# Patient Record
Sex: FEMALE | Race: WHITE | NOT HISPANIC OR LATINO | Employment: OTHER | URBAN - METROPOLITAN AREA
[De-identification: names, ages, dates, MRNs, and addresses within clinical notes are randomized per-mention and may not be internally consistent; named-entity substitution may affect disease eponyms.]

---

## 2024-05-21 ENCOUNTER — OFFICE VISIT (OUTPATIENT)
Dept: CARDIOLOGY | Facility: CLINIC | Age: 64
End: 2024-05-21
Payer: COMMERCIAL

## 2024-05-21 VITALS
SYSTOLIC BLOOD PRESSURE: 144 MMHG | WEIGHT: 172 LBS | OXYGEN SATURATION: 98 % | HEART RATE: 76 BPM | DIASTOLIC BLOOD PRESSURE: 87 MMHG

## 2024-05-21 DIAGNOSIS — E78.5 HYPERLIPIDEMIA, UNSPECIFIED HYPERLIPIDEMIA TYPE: ICD-10-CM

## 2024-05-21 DIAGNOSIS — I71.21 ANEURYSM OF ASCENDING AORTA WITHOUT RUPTURE (CMS-HCC): Primary | ICD-10-CM

## 2024-05-21 DIAGNOSIS — I10 PRIMARY HYPERTENSION: ICD-10-CM

## 2024-05-21 DIAGNOSIS — Z82.49 FAMILY HISTORY OF AORTIC ANEURYSM: ICD-10-CM

## 2024-05-21 PROCEDURE — 3077F SYST BP >= 140 MM HG: CPT | Performed by: STUDENT IN AN ORGANIZED HEALTH CARE EDUCATION/TRAINING PROGRAM

## 2024-05-21 PROCEDURE — 99204 OFFICE O/P NEW MOD 45 MIN: CPT | Performed by: STUDENT IN AN ORGANIZED HEALTH CARE EDUCATION/TRAINING PROGRAM

## 2024-05-21 PROCEDURE — 99214 OFFICE O/P EST MOD 30 MIN: CPT | Performed by: STUDENT IN AN ORGANIZED HEALTH CARE EDUCATION/TRAINING PROGRAM

## 2024-05-21 PROCEDURE — 93005 ELECTROCARDIOGRAM TRACING: CPT | Performed by: STUDENT IN AN ORGANIZED HEALTH CARE EDUCATION/TRAINING PROGRAM

## 2024-05-21 PROCEDURE — 1159F MED LIST DOCD IN RCRD: CPT | Performed by: STUDENT IN AN ORGANIZED HEALTH CARE EDUCATION/TRAINING PROGRAM

## 2024-05-21 PROCEDURE — 1036F TOBACCO NON-USER: CPT | Performed by: STUDENT IN AN ORGANIZED HEALTH CARE EDUCATION/TRAINING PROGRAM

## 2024-05-21 PROCEDURE — 1160F RVW MEDS BY RX/DR IN RCRD: CPT | Performed by: STUDENT IN AN ORGANIZED HEALTH CARE EDUCATION/TRAINING PROGRAM

## 2024-05-21 PROCEDURE — 3079F DIAST BP 80-89 MM HG: CPT | Performed by: STUDENT IN AN ORGANIZED HEALTH CARE EDUCATION/TRAINING PROGRAM

## 2024-05-21 RX ORDER — DEXTROAMPHETAMINE SACCHARATE, AMPHETAMINE ASPARTATE, DEXTROAMPHETAMINE SULFATE AND AMPHETAMINE SULFATE 7.5; 7.5; 7.5; 7.5 MG/1; MG/1; MG/1; MG/1
TABLET ORAL
COMMUNITY
Start: 2024-05-20

## 2024-05-21 RX ORDER — ALPRAZOLAM 0.25 MG/1
TABLET ORAL
COMMUNITY

## 2024-05-21 RX ORDER — ASPIRIN 81 MG/1
81 TABLET ORAL
COMMUNITY

## 2024-05-21 RX ORDER — BUPROPION HYDROCHLORIDE 150 MG/1
150 TABLET ORAL
COMMUNITY
Start: 2024-05-06

## 2024-05-21 RX ORDER — DICLOFENAC SODIUM 30 MG/G
GEL TOPICAL
COMMUNITY
Start: 2024-03-05

## 2024-05-21 RX ORDER — ROSUVASTATIN CALCIUM 5 MG/1
5 TABLET, COATED ORAL
COMMUNITY

## 2024-05-21 RX ORDER — ALBUTEROL SULFATE 90 UG/1
2 AEROSOL, METERED RESPIRATORY (INHALATION) EVERY 6 HOURS PRN
COMMUNITY
Start: 2024-03-28

## 2024-05-21 RX ORDER — METOPROLOL SUCCINATE 25 MG/1
50 TABLET, EXTENDED RELEASE ORAL DAILY
COMMUNITY

## 2024-05-21 RX ORDER — ALBUTEROL SULFATE 0.83 MG/ML
2.5 SOLUTION RESPIRATORY (INHALATION) EVERY 6 HOURS PRN
COMMUNITY
Start: 2023-09-19

## 2024-05-21 NOTE — PROGRESS NOTES
Cardiology New Patient History and Physical    Reason for referral: ascending aortic aneurysm; HTN, DLD    HPI: Trey Brasher is a 64 y.o.  female who presents today for history of ascending aortic aneurysm. Past medical history of ascending aortic aneurysm, dyslipidemia, family history of aortic aneurysm, ADD, and varicose veins.    Patient presented to cardiology clinic on 2024.  Patient notes a history of mild enlargement of the ascending aorta 4.3 cm in diameter seen on transthoracic echocardiogram at [facility] (2024).  Family history of aortic dissection in her father.  Paternal grandfather and her paternal cousin also had aortic aneurysms.  Patient is currently asymptomatic from a cardiovascular perspective and tolerating physical activity without active cardiovascular complaints.  Cardiac risk factors include dyslipidemia and family history of aortic aneurysms as above.    Past Medical History:   She has a past medical history of Ascending aortic aneurysm (CMS-[facility]).    Surgical History:   She has a past surgical history that includes Cholecystectomy; Tubal ligation; and Tonsillectomy.    Family History:   Family History   Problem Relation Name Age of Onset    Aortic dissection Father      Aortic aneurysm Father      Stroke Father      Aortic aneurysm Paternal Grandfather           of aneurysm    Aortic aneurysm Paternal Cousin       Allergies:  Codeine and Sulfa (sulfonamide antibiotics)     Social History:   - Non-smoker; no illicit drug use    Prior Cardiovascular Testing (personally reviewed):     ECG (2024) Normal sinus rhythm; Normal ECG    Transthoracic echocardiogram (2024; [facility])  - Normal LV size and systolic function; LVEF 55-60%; normal wall motion; normal diastolic function  - Mild aortic regurgitation  - The ascending aorta is mildly enlarged at 4.3 cm increasing  - Normal RV size and function    Review of Systems:  Review of Systems    Constitutional: Negative.   HENT: Negative.     Eyes: Negative.    Cardiovascular:  Negative for chest pain, dyspnea on exertion, near-syncope, orthopnea, palpitations, paroxysmal nocturnal dyspnea and syncope.   Respiratory: Negative.     Endocrine: Negative.    Hematologic/Lymphatic: Negative.    Skin: Negative.    Musculoskeletal: Negative.    Gastrointestinal: Negative.    Genitourinary: Negative.    Neurological: Negative.    Psychiatric/Behavioral: Negative.     Allergic/Immunologic: Negative.        Objective     Outpatient Medications:    Current Outpatient Medications:     albuterol 2.5 mg /3 mL (0.083 %) nebulizer solution, Inhale 3 mL (2.5 mg) every 6 hours if needed., Disp: , Rfl:     albuterol 90 mcg/actuation inhaler, Inhale 2 puffs every 6 hours if needed., Disp: , Rfl:     ALPRAZolam (Xanax) 0.25 mg tablet, TAKE 1 Tablet BY MOUTH THREE TIMES DAILY AS NEEDED AFTER MEALS, Disp: , Rfl:     amphetamine-dextroamphetamine (Adderall) 30 mg tablet, , Disp: , Rfl:     aspirin 81 mg EC tablet, Take 1 tablet (81 mg) by mouth once daily., Disp: , Rfl:     buPROPion XL (Wellbutrin XL) 150 mg 24 hr tablet, Take 1 tablet (150 mg) by mouth once daily in the morning. Take before meals., Disp: , Rfl:     diclofenac sodium 3 % gel, APPLY TOPICALLY TO THE AFFECTED AREA TWICE DAILY AS NEEDED, Disp: , Rfl:     metoprolol succinate XL (Toprol-XL) 25 mg 24 hr tablet, Take 2 tablets (50 mg) by mouth once daily., Disp: , Rfl:     rosuvastatin (Crestor) 5 mg tablet, Take 1 tablet (5 mg) by mouth once daily., Disp: , Rfl:      Last Recorded Vitals  /87 (BP Location: Right arm, Patient Position: Sitting, BP Cuff Size: Adult)   Pulse 76   Wt 78 kg (172 lb)   SpO2 98%     Physical Exam:  Physical Exam  Constitutional:       General: She is not in acute distress.  HENT:      Head: Normocephalic.      Mouth/Throat:      Mouth: Mucous membranes are moist.   Eyes:      Extraocular Movements: Extraocular movements intact.       Conjunctiva/sclera: Conjunctivae normal.   Neck:      Vascular: No carotid bruit or JVD.   Cardiovascular:      Rate and Rhythm: Normal rate and regular rhythm.      Pulses: Normal pulses.      Heart sounds: No murmur heard.  Pulmonary:      Effort: Pulmonary effort is normal. No respiratory distress.      Breath sounds: Normal breath sounds.   Abdominal:      General: Bowel sounds are normal. There is no distension.      Palpations: Abdomen is soft.   Musculoskeletal:         General: No swelling.   Skin:     General: Skin is warm and dry.   Neurological:      General: No focal deficit present.      Mental Status: She is alert.      Cranial Nerves: No cranial nerve deficit.      Motor: No weakness.   Psychiatric:         Mood and Affect: Mood normal.         Behavior: Behavior normal.       Lab Review:    Serum creatinine 0.91 (8/3/2021)    Assessment:   64 y.o.  female who presents today for history of ascending aortic aneurysm. Past medical history of ascending aortic aneurysm, dyslipidemia, family history of aortic aneurysm, ADD, and varicose veins.    Patient is currently asymptomatic from a cardiac perspective.  Given history of mild enlargement of the ascending aorta and her cardiac risk factors recommend further evaluation with CT cardiac scoring.  Patient struck to keep home blood pressure log given elevated blood pressure in clinic today without diagnosis of hypertension.  Blood pressure suboptimally controlled would then recommend losartan.    Overall Plan:  1.  Mild enlargement of the ascending aorta seen on transthoracic echocardiogram May 2024  - Check CT cardiac scoring  - If significant enlargement of the aorta seen on CT cardiac scoring would then recommend dedicated CTA of the aorta  - Monitor clinically and with serial imaging  - If blood pressure suboptimally controlled would then initiate losartan    2.  Elevated blood pressure without diagnosis of hypertension  - Patient struck to  "keep home blood pressure log  - Goal blood pressure less than 130/90 mmHg  - If not at goal then initiate losartan    3.  Dyslipidemia  - Goal LDL to be determined  - CT cardiac scoring as above  - Encouraged heart healthy/Mediterranean style diet    4. Discussed \"red flag\" symptoms that should prompt immediate medical attention; patient verbalized understanding    Disposition: Return to cardiology clinic after above testing    Tj Spence MD        "

## 2024-05-21 NOTE — PATIENT INSTRUCTIONS
Please keep a log of your blood pressure; bring your blood pressure log to your follow-up appointment.     Given your heart risk factors and history of ascending aortic aneurysm we will get a CT heart artery score.     Thank you for your visit today. Please contact our office (via Blipparhart or phone) with any additional questions.     [facility] [place]    LIBORIO Reynoso/Clinic Nurse for:    Dr. Bebe Pisano    [address], [address]  [place] [address]    Phone: [phone] Press Option 5 then Option 3 to speak with the Clinic Nurse ([place])    _____    To Reach:    Billing Questions -    [phone]  Scheduling / Rescheduling -  Option 1  Refills / Medication Requests -  Option 3  General Office / [place] -  Option 4  Results -     Option 6  Medical Records -    Option 7  Repeat Options -    Option 9

## 2024-05-24 LAB
ATRIAL RATE: 96 BPM
P AXIS: 74 DEGREES
P OFFSET: 197 MS
P ONSET: 140 MS
PR INTERVAL: 154 MS
Q ONSET: 217 MS
QRS COUNT: 16 BEATS
QRS DURATION: 86 MS
QT INTERVAL: 346 MS
QTC CALCULATION(BAZETT): 437 MS
QTC FREDERICIA: 404 MS
R AXIS: 57 DEGREES
T AXIS: 46 DEGREES
T OFFSET: 390 MS
VENTRICULAR RATE: 96 BPM

## 2024-06-10 ASSESSMENT — ENCOUNTER SYMPTOMS
EYES NEGATIVE: 1
DYSPNEA ON EXERTION: 0
PND: 0
NEUROLOGICAL NEGATIVE: 1
MUSCULOSKELETAL NEGATIVE: 1
ALLERGIC/IMMUNOLOGIC NEGATIVE: 1
ORTHOPNEA: 0
PSYCHIATRIC NEGATIVE: 1
SYNCOPE: 0
RESPIRATORY NEGATIVE: 1
NEAR-SYNCOPE: 0
ENDOCRINE NEGATIVE: 1
HEMATOLOGIC/LYMPHATIC NEGATIVE: 1
PALPITATIONS: 0
CONSTITUTIONAL NEGATIVE: 1
GASTROINTESTINAL NEGATIVE: 1

## 2024-07-18 ENCOUNTER — OFFICE VISIT (OUTPATIENT)
Dept: CARDIOLOGY | Facility: CLINIC | Age: 64
End: 2024-07-18
Payer: COMMERCIAL

## 2024-07-18 VITALS
WEIGHT: 169.6 LBS | SYSTOLIC BLOOD PRESSURE: 137 MMHG | OXYGEN SATURATION: 97 % | HEART RATE: 88 BPM | DIASTOLIC BLOOD PRESSURE: 88 MMHG

## 2024-07-18 DIAGNOSIS — N18.31 STAGE 3A CHRONIC KIDNEY DISEASE (MULTI): ICD-10-CM

## 2024-07-18 DIAGNOSIS — I10 PRIMARY HYPERTENSION: ICD-10-CM

## 2024-07-18 DIAGNOSIS — I71.21 ANEURYSM OF ASCENDING AORTA WITHOUT RUPTURE (CMS-HCC): Primary | ICD-10-CM

## 2024-07-18 DIAGNOSIS — E78.5 HYPERLIPIDEMIA, UNSPECIFIED HYPERLIPIDEMIA TYPE: ICD-10-CM

## 2024-07-18 DIAGNOSIS — J98.11 ATELECTASIS: ICD-10-CM

## 2024-07-18 DIAGNOSIS — R91.1 LUNG NODULE SEEN ON IMAGING STUDY: ICD-10-CM

## 2024-07-18 PROBLEM — R00.0 TACHYCARDIA: Status: ACTIVE | Noted: 2024-07-18

## 2024-07-18 PROBLEM — R31.29 MICROSCOPIC HEMATURIA: Status: ACTIVE | Noted: 2024-07-18

## 2024-07-18 PROBLEM — U07.1 COVID-19 VIRUS INFECTION: Status: ACTIVE | Noted: 2024-07-18

## 2024-07-18 PROBLEM — Z86.19 HISTORY OF SHINGLES: Status: ACTIVE | Noted: 2024-07-18

## 2024-07-18 PROBLEM — J45.909 REACTIVE AIRWAY DISEASE (HHS-HCC): Status: ACTIVE | Noted: 2024-07-18

## 2024-07-18 PROBLEM — F32.A DEPRESSION: Status: ACTIVE | Noted: 2024-07-18

## 2024-07-18 PROCEDURE — 1160F RVW MEDS BY RX/DR IN RCRD: CPT | Performed by: STUDENT IN AN ORGANIZED HEALTH CARE EDUCATION/TRAINING PROGRAM

## 2024-07-18 PROCEDURE — 3079F DIAST BP 80-89 MM HG: CPT | Performed by: STUDENT IN AN ORGANIZED HEALTH CARE EDUCATION/TRAINING PROGRAM

## 2024-07-18 PROCEDURE — 1159F MED LIST DOCD IN RCRD: CPT | Performed by: STUDENT IN AN ORGANIZED HEALTH CARE EDUCATION/TRAINING PROGRAM

## 2024-07-18 PROCEDURE — 99213 OFFICE O/P EST LOW 20 MIN: CPT | Performed by: STUDENT IN AN ORGANIZED HEALTH CARE EDUCATION/TRAINING PROGRAM

## 2024-07-18 PROCEDURE — 3075F SYST BP GE 130 - 139MM HG: CPT | Performed by: STUDENT IN AN ORGANIZED HEALTH CARE EDUCATION/TRAINING PROGRAM

## 2024-07-18 RX ORDER — ATENOLOL 50 MG/1
50 TABLET ORAL DAILY
Qty: 90 TABLET | Refills: 3 | Status: SHIPPED | OUTPATIENT
Start: 2024-07-18 | End: 2025-07-18

## 2024-07-18 ASSESSMENT — ENCOUNTER SYMPTOMS
PND: 0
PALPITATIONS: 0
NEAR-SYNCOPE: 0
SYNCOPE: 0
ALLERGIC/IMMUNOLOGIC NEGATIVE: 1
CONSTITUTIONAL NEGATIVE: 1
GASTROINTESTINAL NEGATIVE: 1
PSYCHIATRIC NEGATIVE: 1
EYES NEGATIVE: 1
ORTHOPNEA: 0
HEMATOLOGIC/LYMPHATIC NEGATIVE: 1
NEUROLOGICAL NEGATIVE: 1
MUSCULOSKELETAL NEGATIVE: 1
DYSPNEA ON EXERTION: 0
RESPIRATORY NEGATIVE: 1
ENDOCRINE NEGATIVE: 1

## 2024-07-18 NOTE — PATIENT INSTRUCTIONS
We will switch from metoprolol to atenolol to help better control your heart rate.     Your blood pressure is currently well controlled.    We will see you back in cardiology clinic in ~ 6 months or earlier if needed.     Thank you for your visit today. Please contact our office (via VoiceObjectshart or phone) with any additional questions.     [facility] [place]    [place], RN/Clinic Nurse for:    Dr. Bebe Pisano    [address], [address]  [place] [address]    Phone: [phone] Press Option 5 then Option 3 to speak with the Clinic Nurse ([place])    _____    To Reach:    Billing Questions -    [phone]  Scheduling / Rescheduling -  Option 1  Refills / Medication Requests -  Option 3  General Office / [place] -  Option 4  Results -     Option 6  Medical Records -    Option 7  Repeat Options -    Option 9

## 2024-07-18 NOTE — PROGRESS NOTES
Cardiology Established Outpatient Visit    Reason for visit: hx of ascending aortic aneurysm; HTN, DLD    HPI: Trey Brasher is a 64 y.o.  female who presents today for a follow-up visit. Past medical history of ascending aortic aneurysm, dyslipidemia, family history of aortic aneurysm, ADD, and varicose veins.    Patient presented to cardiology clinic on 2024.  Patient notes a history of mild enlargement of the ascending aorta 4.3 cm in diameter seen on transthoracic echocardiogram at [facility] (2024).  Family history of aortic dissection in her father.  Paternal grandfather and her paternal cousin also had aortic aneurysms.  Patient is currently asymptomatic from a cardiovascular perspective and tolerating physical activity without active cardiovascular complaints.  Cardiac risk factors include dyslipidemia and family history of aortic aneurysms as above.    Trey returned to cardiology clinic on 2024.  CT cardiac scoring in 2024 showed total coronary calcium score 0; mild ectasia of the mid ascending aorta measuring 4.2 cm.  Patient remains asymptomatic from a cardiac standpoint.  Patient feels her heart rate is suboptimally controlled on metoprolol and was inquiring on alternative rate control medication.  Recommended switching to atenolol 50 mg daily given renal clearance.    Past Medical History:   She has a past medical history of Ascending aortic aneurysm (CMS-HCC).    Surgical History:   She has a past surgical history that includes Cholecystectomy; Tubal ligation; and Tonsillectomy.    Family History:   Family History   Problem Relation Name Age of Onset    Aortic dissection Father      Aortic aneurysm Father      Stroke Father      Aortic aneurysm Paternal Grandfather           of aneurysm    Aortic aneurysm Paternal Cousin       Allergies:  Codeine and Sulfa (sulfonamide antibiotics)     Social History:   - Non-smoker; no illicit drug use    Prior Cardiovascular  Testing (personally reviewed):     CT cardiac scoring (6/4/2024 @ [facility])  - Total Coronary Calcium Score (CAC) = 0 AU   - Mild ectasia of the mid ascending aorta (approximately 4.2 cm to 4.3   cm). Remainder of the visualized thoracic aorta is normal in caliber.  No   significant calcification within the thoracic aorta.     ECG (5/21/2024) Normal sinus rhythm; Normal ECG    Transthoracic echocardiogram (5/8/2024; [facility])  - Normal LV size and systolic function; LVEF 55-60%; normal wall motion; normal diastolic function  - Mild aortic regurgitation  - The ascending aorta is mildly enlarged at 4.3 cm increasing  - Normal RV size and function    Review of Systems:  Review of Systems   Constitutional: Negative.   HENT: Negative.     Eyes: Negative.    Cardiovascular:  Negative for chest pain, dyspnea on exertion, near-syncope, orthopnea, palpitations, paroxysmal nocturnal dyspnea and syncope.   Respiratory: Negative.     Endocrine: Negative.    Hematologic/Lymphatic: Negative.    Skin: Negative.    Musculoskeletal: Negative.    Gastrointestinal: Negative.    Genitourinary: Negative.    Neurological: Negative.    Psychiatric/Behavioral: Negative.     Allergic/Immunologic: Negative.        Objective     Outpatient Medications:    Current Outpatient Medications:     albuterol 2.5 mg /3 mL (0.083 %) nebulizer solution, Inhale 3 mL (2.5 mg) every 6 hours if needed., Disp: , Rfl:     albuterol 90 mcg/actuation inhaler, Inhale 2 puffs every 6 hours if needed., Disp: , Rfl:     ALPRAZolam (Xanax) 0.25 mg tablet, TAKE 1 Tablet BY MOUTH THREE TIMES DAILY AS NEEDED AFTER MEALS, Disp: , Rfl:     amphetamine-dextroamphetamine (Adderall) 30 mg tablet, , Disp: , Rfl:     aspirin 81 mg EC tablet, Take 1 tablet (81 mg) by mouth once daily., Disp: , Rfl:     buPROPion XL (Wellbutrin XL) 150 mg 24 hr tablet, Take 1 tablet (150 mg) by mouth once daily in the morning. Take before meals., Disp: , Rfl:     diclofenac sodium 3 % gel,  APPLY TOPICALLY TO THE AFFECTED AREA TWICE DAILY AS NEEDED, Disp: , Rfl:     metoprolol succinate XL (Toprol-XL) 25 mg 24 hr tablet, Take 2 tablets (50 mg) by mouth once daily., Disp: , Rfl:     rosuvastatin (Crestor) 5 mg tablet, Take 1 tablet (5 mg) by mouth once daily., Disp: , Rfl:      Last Recorded Vitals  /88 Comment: home bp cuff checked  Pulse 88   Wt 76.9 kg (169 lb 9.6 oz)   SpO2 97%     Physical Exam:  Physical Exam  Constitutional:       General: She is not in acute distress.  HENT:      Head: Normocephalic.      Mouth/Throat:      Mouth: Mucous membranes are moist.   Eyes:      Extraocular Movements: Extraocular movements intact.      Conjunctiva/sclera: Conjunctivae normal.   Neck:      Vascular: No carotid bruit or JVD.   Cardiovascular:      Rate and Rhythm: Normal rate and regular rhythm.      Pulses: Normal pulses.      Heart sounds: No murmur heard.  Pulmonary:      Effort: Pulmonary effort is normal. No respiratory distress.      Breath sounds: Normal breath sounds.   Abdominal:      General: Bowel sounds are normal. There is no distension.      Palpations: Abdomen is soft.   Musculoskeletal:         General: No swelling.   Skin:     General: Skin is warm and dry.   Neurological:      General: No focal deficit present.      Mental Status: She is alert.      Cranial Nerves: No cranial nerve deficit.      Motor: No weakness.   Psychiatric:         Mood and Affect: Mood normal.         Behavior: Behavior normal.       Lab Review:    Serum creatinine 0.91 (8/3/2021)    Assessment:   64 y.o.  female who presents today for history of ascending aortic aneurysm. Past medical history of ascending aortic aneurysm, dyslipidemia, family history of aortic aneurysm, ADD, and varicose veins.    Trey returned to cardiology clinic on 7/18/2024.  CT cardiac scoring in June 2024 showed total coronary calcium score 0; mild ectasia of the mid ascending aorta measuring 4.2 cm.  Patient remains  "asymptomatic from a cardiac standpoint.  Patient feels her heart rate is suboptimally controlled on metoprolol and was inquiring on alternative rate control medication.  Recommended switching to atenolol 50 mg daily given renal clearance.    Overall Plan:  1.  Mild enlargement of the ascending aorta (4.2 cm per CT imaging 6/2024)  - Monitor clinically and with serial imaging; repeat CT imaging in ~ 12-18 months  - BP currently well-controlled; If blood pressure suboptimally controlled would then initiate losartan    2.  Hypertension (goal BP < 130/90 mmHg; well-controlled per home BP log)  - Continue beta blockade > switch to atenolol 50 mg daily as above  - If not at goal then initiate losartan 25 mg daily     3.  Dyslipidemia  - Goal LDL less than 100 mg/dl  - CT cardiac scoring as above  - Encouraged heart healthy/Mediterranean style diet    4. Discussed \"red flag\" symptoms that should prompt immediate medical attention; patient verbalized understanding    Disposition: Return to cardiology clinic in 6-12 months     Tj Spence MD        "

## 2024-08-02 PROBLEM — R31.9 BLOOD IN URINE: Status: ACTIVE | Noted: 2024-08-02

## 2024-08-02 RX ORDER — ONDANSETRON 4 MG/1
TABLET, FILM COATED ORAL
COMMUNITY
Start: 2024-07-15

## 2024-08-02 RX ORDER — PEAK FLOW METER
EACH MISCELLANEOUS
COMMUNITY
Start: 2024-07-18

## 2024-08-02 RX ORDER — VALACYCLOVIR HYDROCHLORIDE 500 MG/1
500 TABLET, FILM COATED ORAL 2 TIMES DAILY
COMMUNITY

## 2024-08-02 RX ORDER — TIZANIDINE 4 MG/1
4 TABLET ORAL NIGHTLY
COMMUNITY
Start: 2024-02-19

## 2024-08-15 ENCOUNTER — TELEMEDICINE (OUTPATIENT)
Dept: PRIMARY CARE | Facility: CLINIC | Age: 64
End: 2024-08-15
Payer: COMMERCIAL

## 2024-08-15 DIAGNOSIS — J98.11 ATELECTASIS OF BOTH LUNGS: ICD-10-CM

## 2024-08-15 DIAGNOSIS — R05.9 COUGH IN ADULT PATIENT: ICD-10-CM

## 2024-08-15 DIAGNOSIS — R91.8 LUNG NODULES: Primary | ICD-10-CM

## 2024-08-15 SDOH — ECONOMIC STABILITY: FOOD INSECURITY: WITHIN THE PAST 12 MONTHS, THE FOOD YOU BOUGHT JUST DIDN'T LAST AND YOU DIDN'T HAVE MONEY TO GET MORE.: NEVER TRUE

## 2024-08-15 SDOH — ECONOMIC STABILITY: FOOD INSECURITY: WITHIN THE PAST 12 MONTHS, YOU WORRIED THAT YOUR FOOD WOULD RUN OUT BEFORE YOU GOT MONEY TO BUY MORE.: NEVER TRUE

## 2024-08-15 ASSESSMENT — ENCOUNTER SYMPTOMS
DEPRESSION: 0
OCCASIONAL FEELINGS OF UNSTEADINESS: 0
LOSS OF SENSATION IN FEET: 0

## 2024-08-15 ASSESSMENT — PATIENT HEALTH QUESTIONNAIRE - PHQ9
1. LITTLE INTEREST OR PLEASURE IN DOING THINGS: NOT AT ALL
SUM OF ALL RESPONSES TO PHQ9 QUESTIONS 1 AND 2: 0
2. FEELING DOWN, DEPRESSED OR HOPELESS: NOT AT ALL

## 2024-08-15 ASSESSMENT — PAIN SCALES - GENERAL: PAINLEVEL: 0-NO PAIN

## 2024-08-15 ASSESSMENT — LIFESTYLE VARIABLES
SKIP TO QUESTIONS 9-10: 1
HOW MANY STANDARD DRINKS CONTAINING ALCOHOL DO YOU HAVE ON A TYPICAL DAY: PATIENT DOES NOT DRINK
HOW OFTEN DO YOU HAVE A DRINK CONTAINING ALCOHOL: NEVER
AUDIT-C TOTAL SCORE: 0
HOW OFTEN DO YOU HAVE SIX OR MORE DRINKS ON ONE OCCASION: NEVER

## 2024-08-15 ASSESSMENT — COLUMBIA-SUICIDE SEVERITY RATING SCALE - C-SSRS
6. HAVE YOU EVER DONE ANYTHING, STARTED TO DO ANYTHING, OR PREPARED TO DO ANYTHING TO END YOUR LIFE?: NO
1. IN THE PAST MONTH, HAVE YOU WISHED YOU WERE DEAD OR WISHED YOU COULD GO TO SLEEP AND NOT WAKE UP?: NO
2. HAVE YOU ACTUALLY HAD ANY THOUGHTS OF KILLING YOURSELF?: NO

## 2024-08-15 NOTE — PROGRESS NOTES
Subjective   Patient ID: Trey Brasher is a 64 y.o. female who presents for New Patient Visit (Trey has a new visit regarding a lung nodule. Never used tobacco products. No personal history of cancer. No family history of cancer. ).  [id number] y.o. female presents today for lung nodule clinic.      Never used tobacco products. No personal history of cancer. No family history of cancer.    Telephone visit between Trey Brasher and Nelda Marks CNP at [facility] per patient request    CT Cardiac score 6/4/2024  bibasilar atelectasis.  small (<6 mm) non-calcified   lung nodule in the left lung field.     Review of Systems  Review of systems: Present-feeling well. Not present-chills, fatigue and fever.  Respiratory: Cough > 6 months.  Not present-difficulty breathing, bloody sputum.  Cardiovascular: Not present-chest pain, palpitations, dyspnea on exertion.  Objective   There were no vitals taken for this visit.   Assessment/Plan   Diagnoses and all orders for this visit:  Lung nodules  -     CT chest wo IV contrast; Future  Cough in adult patient  -     CT chest wo IV contrast; Future  Atelectasis of both lungs  -     CT chest wo IV contrast; Future

## 2024-08-15 NOTE — PATIENT INSTRUCTIONS
Cough > 6 months   Bibasilar atelectasis   Multiple lung nodules on ct cardiac score   Recommend ct chest   Patient will be notified of results as they become available.         L[facility]    [address]  [place], [place] [address]  Phone [phone]  Fax [phone]  Nurse Coordinator [phone]                                          Welcome to the [facility]    Today was the initial consult with the lung nodule clinic to determine proper recommendations for follow up. Your care is coordinated to ensure timely management.  As you know, early detection of cancer is very important.  Nodules that are large, look suspicious or have changed over time is why further evaluation such as the additional imaging test that we have ordered is needed. Our clinic will work closely with you in choosing the best next step.       What is my next step?  We will assist with scheduling scans, results reviews, and referrals for priority appointments.      Who do I call?  Your care coordinator for the lung nodule clinic can be contacted at [phone]  All scheduling needs can be assisted within the Cardiac Surgery/Thoracic Surgery/Lung Nodule Clinic offices at [phone].              Table  Israel H, Maty DP, Elizabeth DIAMOND, et al. Guidelines for Management of Incidental Pulmonary Nodules Detected on CT Images: From the Fleischner Society 2017. Radiology 2017;284:228-243.

## 2024-09-05 ENCOUNTER — APPOINTMENT (OUTPATIENT)
Dept: RADIOLOGY | Facility: CLINIC | Age: 64
End: 2024-09-05
Payer: COMMERCIAL

## 2024-10-14 ENCOUNTER — TELEPHONE (OUTPATIENT)
Dept: PRIMARY CARE | Facility: CLINIC | Age: 64
End: 2024-10-14
Payer: COMMERCIAL

## 2024-10-14 NOTE — TELEPHONE ENCOUNTER
After multiple attempts at contact with no returned communication to the lung nodule clinic a discharge is placed and sent via certified letter.  A discharge will discontinue communication efforts, but the patient can continue care when ready.

## 2025-01-21 ENCOUNTER — APPOINTMENT (OUTPATIENT)
Dept: CARDIOLOGY | Facility: CLINIC | Age: 65
End: 2025-01-21
Payer: COMMERCIAL